# Patient Record
Sex: FEMALE | Race: WHITE | Employment: FULL TIME | URBAN - METROPOLITAN AREA
[De-identification: names, ages, dates, MRNs, and addresses within clinical notes are randomized per-mention and may not be internally consistent; named-entity substitution may affect disease eponyms.]

---

## 2021-12-02 NOTE — PROGRESS NOTES
ASSESSMENT/PLAN:  Below is the assessment and plan developed based on review of pertinent history, physical exam, labs, studies, and medications. 1. Patellofemoral chondrosis of left knee      No follow-ups on file. In discussion with the patient, we considered the numerus possible diagnoses that could be contributing to their present symptoms. We also deliberated on the extensive management options that must be considered to treat their current condition. We reviewed their accessible prior medical records, diagnostic tests, and current health and employment information. We considered how these symptoms were affecting the patient´s activities of daily living as well as employment and fitness activities. The patient had various questions regarding the possible risks, benefits, complications, morbidity and mortality regarding their diagnosis and treatment options. The patients´ comorbidities were considered, and I advocated that they consider maximizing lifestyle modification through nutrition and exercise to aid in addressing their symptoms. Shared decision making yielded an understanding to move forward with conservation treatment preferences. The patient expressed understanding that if conservative management fails to alleviate the present symptoms they will return to office for re-evaluation and consideration of additional diagnostic tests and potential surgical options. In the interim, we have recommned ice, elevation and anti-inflammatory medications along with a physician directed home exercise program. We discussed the risks and common side effects of anti-inflamatory medications and instructed the patient to discontinue the medication and contact us if they experienced any side effects. The patient was encouraged to discuss the possible side effects with their family physician or pharmacist prior to initiating any new medications.        Given that the patient's symptoms are increasing in frequency and duration we have decided to prescribe physical therapy. We talked about the fact that the goal of physical therapy is for the therapsit to assist in developing a program to help return the patient to full strength, function and mobility and decrease pain. We also discussed that the therpasit may combine several techniques to help decrease pain. These include but are not limited to stretching,  balance exercises, strength training, massage, cold and heat therapy, and electrical stimulation. Althoough, physical therpay is generally safe, we went over the potential risks to include the worsening of pre-existing conditions, continued pain and no improvement in flexibility, mobility and strength. We will have the patient follow up after physical therapy to closely monitor their progress. We talked about following up sooner if therapy is not progressing on a weekly basis. After a long discussion regarding treatment options, we have decided to prescribe an oral medication. We discussed the risks and common side effects of the medication and instructed the patient to discontinue the medication and contact us if they experienced any side effects. We also encouraged the patient to discuss the possible side effects with their family physician or pharmacist prior to initiating any new medications. We will see her back in 2 months time to evaluate her progress. Will allow her to return to work without restrictions. We will continue her on Mobic. SUBJECTIVE/OBJECTIVE:  Case Vargas (: 1955) is a 61 y.o. female, patient,here for evaluation of the Knee Pain (left)  The patient returns today for follow-up for her left knee. She has had a long history with the left knee. She originally injured it at work. She underwent an arthroscopy of the knee with chondroplasty and plica excision approximately 4 months ago. She is attended physical therapy.   She is continued have some popping clicking in her knee. She denies any reinjury. Continues to complain over the lateral femoral condyle and lateral patella. She is back to work, able to take a 10 min break every hour. She complains of swelling in the knee. Physical Exam    Upon physical examination, the patient is well developed, well nourished, alert and oriented times three, with normal mood and affect and walks with an antalgic gait. Upon examination of the left knee, the patient is nontender to palpation along the medial and lateral joint lines, and has no effusion. They are tender to palpation along the medial and lateral facets of the patella. They have crepitus of the patellofemoral joint with range of motion and discomfort with patella grind testing. The patient has no discomfort with Latia´s maneuvers, and the knee is stable. They have full range of motion. They have 5/5 strength, and are neurovascularly intact distally. There is no erythema, warmth or skin lesions present. On examination of the contralateral extremity, the patient is nontender to palpation and has excellent range of motion, stability and strength. Imaging:    I have reviewed the patient´s previous diagnostic tests in an effort to support the diagnosis and treatment options. Allergies   Allergen Reactions    Codeine Hives       Current Outpatient Medications   Medication Sig    diclofenac EC (VOLTAREN) 75 mg EC tablet      No current facility-administered medications for this visit. Past Medical History:   Diagnosis Date    Asthma        History reviewed. No pertinent surgical history. History reviewed. No pertinent family history.     Social History     Socioeconomic History    Marital status: SINGLE     Spouse name: Not on file    Number of children: Not on file    Years of education: Not on file    Highest education level: Not on file   Occupational History    Not on file   Tobacco Use    Smoking status: Current Every Day Smoker    Smokeless tobacco: Never Used   Substance and Sexual Activity    Alcohol use: Never    Drug use: Never    Sexual activity: Not on file   Other Topics Concern    Not on file   Social History Narrative    Not on file     Social Determinants of Health     Financial Resource Strain:     Difficulty of Paying Living Expenses: Not on file   Food Insecurity:     Worried About Running Out of Food in the Last Year: Not on file    Te of Food in the Last Year: Not on file   Transportation Needs:     Lack of Transportation (Medical): Not on file    Lack of Transportation (Non-Medical): Not on file   Physical Activity:     Days of Exercise per Week: Not on file    Minutes of Exercise per Session: Not on file   Stress:     Feeling of Stress : Not on file   Social Connections:     Frequency of Communication with Friends and Family: Not on file    Frequency of Social Gatherings with Friends and Family: Not on file    Attends Taoist Services: Not on file    Active Member of [address] [place] [address] or Organizations: Not on file    Attends Club or Organization Meetings: Not on file    Marital Status: Not on file   Intimate Partner Violence:     Fear of Current or Ex-Partner: Not on file    Emotionally Abused: Not on file    Physically Abused: Not on file    Sexually Abused: Not on file   Housing Stability:     Unable to Pay for Housing in the Last Year: Not on file    Number of [place] in the Last Year: Not on file    Unstable Housing in the Last Year: Not on file       Review of Systems    Pain Assessment  12/3/2021   Location of Pain Knee   Location Modifiers Left   Severity of Pain 7       Vitals:  Ht 4' 10\" (1.473 m)   Wt 100 lb (45.4 kg)   BMI 20.90 kg/m²    Body mass index is 20.9 kg/m². ROS     Positive for: Musculoskeletal    Last edited by Leena Ponce on 12/3/2021 12:10 PM. (History)            An electronic signature was used to authenticate this note.   -- Avinash Nichols MD

## 2021-12-03 ENCOUNTER — OFFICE VISIT (OUTPATIENT)
Dept: ORTHOPEDIC SURGERY | Age: 66
End: 2021-12-03
Payer: OTHER MISCELLANEOUS

## 2021-12-03 VITALS — HEIGHT: 58 IN | BODY MASS INDEX: 20.99 KG/M2 | WEIGHT: 100 LBS

## 2021-12-03 DIAGNOSIS — M22.42 PATELLOFEMORAL CHONDROSIS OF LEFT KNEE: Primary | ICD-10-CM

## 2021-12-03 PROCEDURE — 99214 OFFICE O/P EST MOD 30 MIN: CPT | Performed by: ORTHOPAEDIC SURGERY

## 2021-12-03 RX ORDER — DICLOFENAC SODIUM 75 MG/1
TABLET, DELAYED RELEASE ORAL
COMMUNITY
Start: 2021-09-03 | End: 2022-05-09

## 2021-12-03 RX ORDER — MELOXICAM 15 MG/1
15 TABLET ORAL DAILY
Qty: 30 TABLET | Refills: 3 | Status: SHIPPED | OUTPATIENT
Start: 2021-12-03 | End: 2022-02-11 | Stop reason: SDUPTHER

## 2021-12-03 NOTE — LETTER
NOTIFICATION RETURN TO WORK / SCHOOL    12/3/2021 12:26 PM    Ms. [address] [address]      To Whom It May Concern:    Long Medina is currently under the care of [facility]. She will return to work/school on: She will continue to work as tolerated on the left knee. We will see her back in 2 months time to evaluate her progress. She will continue her formal physical therapy 3 times a week. We will continue her on the meloxicam.    If there are questions or concerns please have the patient contact our office.         Sincerely,      Harper Mackey MD

## 2022-02-10 PROBLEM — M22.42 PATELLOFEMORAL CHONDROSIS OF LEFT KNEE: Status: ACTIVE | Noted: 2022-02-10

## 2022-02-11 ENCOUNTER — OFFICE VISIT (OUTPATIENT)
Dept: ORTHOPEDIC SURGERY | Age: 67
End: 2022-02-11
Payer: OTHER MISCELLANEOUS

## 2022-02-11 VITALS — BODY MASS INDEX: 20.99 KG/M2 | WEIGHT: 100 LBS | HEIGHT: 58 IN

## 2022-02-11 DIAGNOSIS — M22.42 PATELLOFEMORAL CHONDROSIS OF LEFT KNEE: Primary | ICD-10-CM

## 2022-02-11 PROCEDURE — 99214 OFFICE O/P EST MOD 30 MIN: CPT | Performed by: ORTHOPAEDIC SURGERY

## 2022-02-11 RX ORDER — MELOXICAM 15 MG/1
15 TABLET ORAL DAILY
Qty: 30 TABLET | Refills: 3 | Status: SHIPPED | OUTPATIENT
Start: 2022-02-11 | End: 2022-08-24

## 2022-02-11 NOTE — LETTER
NOTIFICATION RETURN TO WORK / SCHOOL    2/11/2022 12:36 PM    Ms. [address] [address]      To Whom It May Concern:    Addis Trammell is currently under the care of [facility]. She will return to work on 2/14/2022 in a seated position. If there are questions or concerns please have the patient contact our office.         Sincerely,      Austin Vance MD

## 2022-02-11 NOTE — PROGRESS NOTES
ASSESSMENT/PLAN:  Below is the assessment and plan developed based on review of pertinent history, physical exam, labs, studies, and medications. 1. Patellofemoral chondrosis of left knee      In discussion with the patient, we considered the numerus possible diagnoses that could be contributing to their present symptoms. We also deliberated on the extensive management options that must be considered to treat their current condition. We reviewed their accessible prior medical records, diagnostic tests, and current health and employment information. We considered how these symptoms were affecting the patient´s activities of daily living as well as employment and fitness activities. The patient had various questions regarding the possible risks, benefits, complications, morbidity and mortality regarding their diagnosis and treatment options. The patients´ comorbidities were considered, and I advocated that they consider maximizing lifestyle modification through nutrition and exercise to aid in addressing their symptoms. Shared decision making yielded an understanding to move forward with conservation treatment preferences. The patient expressed understanding that if conservative management fails to alleviate the present symptoms they will return to office for re-evaluation and consideration of additional diagnostic tests and potential surgical options. In the interim, we have recommended ice, elevation,  and anti-inflammatory medications along with a physician directed home exercise program. We discussed the risks and common side effects of anti-inflamatory medications and instructed the patient to discontinue the medication and contact us if they experienced any side effects. The patient was encouraged to discuss the possible side effects with their family physician or pharmacist prior to initiating any new medications.      After a long discussion regarding treatment options, we have decided to prescribe an oral medication. We discussed the risks and common side effects of the medication and instructed the patient to discontinue the medication and contact us if they experienced any side effects. We also encouraged the patient to discuss the possible side effects with their family physician or pharmacist prior to initiating any new medications. Given the fact that her swelling has gotten much worse since she has been on her feet will allow her to return to work on Monday in a seated position. I did offer an aspiration injection which she declined. We will start her on Mobic again. I urged her to ice and elevate. I urged her to return to work on Monday in a seated position. SUBJECTIVE/OBJECTIVE:  Layo Kam (: 1955) is a 61 y.o. female, patient,here for evaluation of the Knee Pain (left)  The patient returns today for follow-up for her left knee. She has had a long history with the left knee. She originally injured it at work. She underwent an arthroscopy of the knee with chondroplasty and plica excision approximately 4 months ago. She is attended physical therapy. She is continued have some popping clicking in her knee. She denies any reinjury. Continues to complain over the lateral femoral condyle and lateral patella. She is back to work, able to take a 10 min break every hour. She complains of swelling in the knee. Physical Exam    Upon physical examination, the patient is well developed, well nourished, alert and oriented times three, with normal mood and affect and walks with an antalgic gait. Upon examination of the left knee, the patient is nontender to palpation along the medial and lateral joint lines, and has no effusion. They are tender to palpation along the medial and lateral facets of the patella. They have crepitus of the patellofemoral joint with range of motion and discomfort with patella grind testing.  The patient has no discomfort with Latia´s maneuvers, and the knee is stable. They have full range of motion. They have 5/5 strength, and are neurovascularly intact distally. There is no erythema, warmth or skin lesions present. On examination of the contralateral extremity, the patient is nontender to palpation and has excellent range of motion, stability and strength. Imaging:    I have reviewed the patient´s previous diagnostic tests in an effort to support the diagnosis and treatment options. Allergies   Allergen Reactions    Codeine Hives       Current Outpatient Medications   Medication Sig    diclofenac EC (VOLTAREN) 75 mg EC tablet      No current facility-administered medications for this visit. Past Medical History:   Diagnosis Date    Asthma        History reviewed. No pertinent surgical history. History reviewed. No pertinent family history. Social History     Socioeconomic History    Marital status: SINGLE     Spouse name: Not on file    Number of children: Not on file    Years of education: Not on file    Highest education level: Not on file   Occupational History    Not on file   Tobacco Use    Smoking status: Current Every Day Smoker    Smokeless tobacco: Never Used   Substance and Sexual Activity    Alcohol use: Never    Drug use: Never    Sexual activity: Not on file   Other Topics Concern    Not on file   Social History Narrative    Not on file     Social Determinants of Health     Financial Resource Strain:     Difficulty of Paying Living Expenses: Not on file   Food Insecurity:     Worried About Running Out of Food in the Last Year: Not on file    Te of Food in the Last Year: Not on file   Transportation Needs:     Lack of Transportation (Medical): Not on file    Lack of Transportation (Non-Medical):  Not on file   Physical Activity:     Days of Exercise per Week: Not on file    Minutes of Exercise per Session: Not on file   Stress:     Feeling of Stress : Not on file   Social Connections:     Frequency of Communication with Friends and Family: Not on file    Frequency of Social Gatherings with Friends and Family: Not on file    Attends Sabianist Services: Not on file    Active Member of Clubs or Organizations: Not on file    Attends Club or Organization Meetings: Not on file    Marital Status: Not on file   Intimate Partner Violence:     Fear of Current or Ex-Partner: Not on file    Emotionally Abused: Not on file    Physically Abused: Not on file    Sexually Abused: Not on file   Housing Stability:     Unable to Pay for Housing in the Last Year: Not on file    Number of [place] in the Last Year: Not on file    Unstable Housing in the Last Year: Not on file       Review of Systems    Pain Assessment  12/3/2021   Location of Pain Knee   Location Modifiers Left   Severity of Pain 7       Vitals:  Ht 4' 10\" (1.473 m)   Wt 100 lb (45.4 kg)   BMI 20.90 kg/m²    Body mass index is 20.9 kg/m². ROS     Positive for: Musculoskeletal    Last edited by Susana Fabry on 12/3/2021 12:10 PM. (History)            An electronic signature was used to authenticate this note.   -- Dionicio Nixon MD

## 2022-03-04 ENCOUNTER — OFFICE VISIT (OUTPATIENT)
Dept: ORTHOPEDIC SURGERY | Age: 67
End: 2022-03-04
Payer: OTHER MISCELLANEOUS

## 2022-03-04 VITALS — HEIGHT: 58 IN | BODY MASS INDEX: 20.99 KG/M2 | WEIGHT: 100 LBS

## 2022-03-04 DIAGNOSIS — M22.42 PATELLOFEMORAL CHONDROSIS OF LEFT KNEE: Primary | ICD-10-CM

## 2022-03-04 PROCEDURE — 20610 DRAIN/INJ JOINT/BURSA W/O US: CPT | Performed by: ORTHOPAEDIC SURGERY

## 2022-03-04 PROCEDURE — 99214 OFFICE O/P EST MOD 30 MIN: CPT | Performed by: ORTHOPAEDIC SURGERY

## 2022-03-04 RX ORDER — HYALURONATE SOD, CROSS-LINKED 30 MG/3 ML
30 SYRINGE (ML) INTRAARTICULAR ONCE
Qty: 1 EACH | Refills: 0 | Status: SHIPPED | OUTPATIENT
Start: 2022-03-04 | End: 2022-03-04

## 2022-03-04 NOTE — LETTER
Date of appointment:  3/4/2022     Examining Physician: Michelle Hernandez MD    Employee information    Name:  Jyoti Julien                                          YOB: 1955                               Medical record number: [id number]    Company information    Reason for visit: Follow up    Body Part Injured: left knee    Follow-up Requested:  Four Weeks    Treatment: Physical therapy prescribed    Work Status Restrictions: No restrictions - May resume normal duties immediately      Signed: Michelle Hernandez MD

## 2022-03-04 NOTE — PROGRESS NOTES
ASSESSMENT/PLAN:  Below is the assessment and plan developed based on review of pertinent history, physical exam, labs, studies, and medications. 1. Patellofemoral chondrosis of left knee      In discussion with the patient, we considered the numerus possible diagnoses that could be contributing to their present symptoms. We also deliberated on the extensive management options that must be considered to treat their current condition. We reviewed their accessible prior medical records, diagnostic tests, and current health and employment information. We considered how these symptoms were affecting the patient´s activities of daily living as well as employment and fitness activities. The patient had various questions regarding the possible risks, benefits, complications, morbidity and mortality regarding their diagnosis and treatment options. The patients´ comorbidities were considered, and I advocated that they consider maximizing lifestyle modification through nutrition and exercise to aid in addressing their symptoms. Shared decision making yielded an understanding to move forward with conservation treatment preferences. The patient expressed understanding that if conservative management fails to alleviate the present symptoms they will return to office for re-evaluation and consideration of additional diagnostic tests and potential surgical options. In the interim, we have recommended ice, elevation,  and anti-inflammatory medications along with a physician directed home exercise program. We discussed the risks and common side effects of anti-inflamatory medications and instructed the patient to discontinue the medication and contact us if they experienced any side effects. The patient was encouraged to discuss the possible side effects with their family physician or pharmacist prior to initiating any new medications.      After a long discussion regarding treatment options, we have decided to prescribe an oral medication. We discussed the risks and common side effects of the medication and instructed the patient to discontinue the medication and contact us if they experienced any side effects. We also encouraged the patient to discuss the possible side effects with their family physician or pharmacist prior to initiating any new medications. We discussed the possibility of an aspiration and injection of a local anesthetic to relieve pain and decrease swelling and inflammation of the left knee. The risks of an injection which include but are not limited to cartilage damage, death of nearby bone, joint infection, nerve damage, temporary flare of pain and inflammation in the joint, thinning of nearby bone (osteoporosis) around the injection site were reviewed at length. We discussed the chance of increased bleeding and bruising if the patient is on blood thinners or certain dietary supplements that have a blood-thinning effect. We advised patients that have an active infection, history of allergic reactions to local anesthetics or take medications that may prohibit them from receiving an injection to talk to their primary care physician before receiving and injection. After explaining the risks and benefits of the procedure and obtaining verbal informed consent from the patient, the proposed area for aspiration was confirmed with the patient. After all questions and concerns were addressed, the skin was prepped with alcohol to reduce the chances of infection. The skin was anesthetized with topical ethylene chloride spray and a local anesthetic was injected into the skin around the site of aspiration. After the local anesthetic became affective, the knee fluid was aspirated from the left knee in a sterile fashion without difficulty. The needle was removed and disposed of in a sterile container. The patient tolerated the injection well and a band-aid was placed on the skin.  The patient was counseled on protecting the injection area, avoiding water submersion for 2 days, icing for pain relief and looking for signs and symptoms of infection. We requested that the patient contact us if any symptoms persist greater than 48 hours after the injection. We talked about the fact that she was not at maximal medical improvement at this point. We talked about the fact that the date would be determined. We gave a prescription for some gel 1 viscosupplementation in order to try that at her next visit. I will see her back in 1 month's time. SUBJECTIVE/OBJECTIVE:  Amira Grigsby (: 1955) is a 61 y.o. female, patient,here for evaluation of the Knee Pain (left)  The patient returns today for follow-up for her left knee. She has had a long history with the left knee. She originally injured it at work. She underwent an arthroscopy of the knee with chondroplasty and plica excision approximately 5 months ago. She is attended physical therapy. She is continued have some popping clicking in her knee. She denies any reinjury. Continues to complain over the lateral femoral condyle and lateral patella. She is back to work, able to take a 10 min break every hour. She complains of swelling in the knee. She returns today for follow-up. She reports she has been taking the diclofenac. She reports her knee swells at the end of the day. She reports she has difficulty wearing the brace because is so swollen. She denies any reinjury. She is accompanied by her  today in the office. Physical Exam    Upon physical examination, the patient is well developed, well nourished, alert and oriented times three, with normal mood and affect and walks with an antalgic gait. Upon examination of the left knee, the patient is nontender to palpation along the medial and lateral joint lines, and has no effusion. They are tender to palpation along the medial and lateral facets of the patella.  They have crepitus of the patellofemoral joint with range of motion and discomfort with patella grind testing. The patient has no discomfort with Latia´s maneuvers, and the knee is stable. They have full range of motion. They have 5/5 strength, and are neurovascularly intact distally. There is no erythema, warmth or skin lesions present. On examination of the contralateral extremity, the patient is nontender to palpation and has excellent range of motion, stability and strength. Imaging:    I have reviewed the patient´s previous diagnostic tests in an effort to support the diagnosis and treatment options. Allergies   Allergen Reactions    Codeine Hives       Current Outpatient Medications   Medication Sig    diclofenac EC (VOLTAREN) 75 mg EC tablet      No current facility-administered medications for this visit. Past Medical History:   Diagnosis Date    Asthma        History reviewed. No pertinent surgical history. History reviewed. No pertinent family history. Social History     Socioeconomic History    Marital status: SINGLE     Spouse name: Not on file    Number of children: Not on file    Years of education: Not on file    Highest education level: Not on file   Occupational History    Not on file   Tobacco Use    Smoking status: Current Every Day Smoker    Smokeless tobacco: Never Used   Substance and Sexual Activity    Alcohol use: Never    Drug use: Never    Sexual activity: Not on file   Other Topics Concern    Not on file   Social History Narrative    Not on file     Social Determinants of Health     Financial Resource Strain:     Difficulty of Paying Living Expenses: Not on file   Food Insecurity:     Worried About Running Out of Food in the Last Year: Not on file    Te of Food in the Last Year: Not on file   Transportation Needs:     Lack of Transportation (Medical): Not on file    Lack of Transportation (Non-Medical):  Not on file   Physical Activity:     Days of Exercise per Week: Not on file    Minutes of Exercise per Session: Not on file   Stress:     Feeling of Stress : Not on file   Social Connections:     Frequency of Communication with Friends and Family: Not on file    Frequency of Social Gatherings with Friends and Family: Not on file    Attends Sabianism Services: Not on file    Active Member of [address] [place] [address] or Organizations: Not on file    Attends Club or Organization Meetings: Not on file    Marital Status: Not on file   Intimate Partner Violence:     Fear of Current or Ex-Partner: Not on file    Emotionally Abused: Not on file    Physically Abused: Not on file    Sexually Abused: Not on file   Housing Stability:     Unable to Pay for Housing in the Last Year: Not on file    Number of [place] in the Last Year: Not on file    Unstable Housing in the Last Year: Not on file       Review of Systems    Pain Assessment  12/3/2021   Location of Pain Knee   Location Modifiers Left   Severity of Pain 7       Vitals:  Ht 4' 10\" (1.473 m)   Wt 100 lb (45.4 kg)   BMI 20.90 kg/m²    Body mass index is 20.9 kg/m². ROS     Positive for: Musculoskeletal    Last edited by Michelle Schmidt on 12/3/2021 12:10 PM. (History)            An electronic signature was used to authenticate this note.   -- Whit Hopper MD

## 2022-03-04 NOTE — LETTER
Date of appointment:  3/4/2022     Examining Physician: Arielle Garrett MD    Employee information    Name:  Claudia Min                                          YOB: 1955                               Medical record number: [id number]    Company information    Reason for visit: Follow up     Body Part Injured: left knee    Follow-up Requested:  Four Weeks    Treatment: Physical therapy prescribed    Work Status Restrictions:  May return to work with the following restrictions: Must be seated to work      Signed: Arielle Garrett MD

## 2022-03-18 PROBLEM — M22.42 PATELLOFEMORAL CHONDROSIS OF LEFT KNEE: Status: ACTIVE | Noted: 2022-02-10

## 2022-04-08 ENCOUNTER — OFFICE VISIT (OUTPATIENT)
Dept: ORTHOPEDIC SURGERY | Age: 67
End: 2022-04-08
Payer: OTHER MISCELLANEOUS

## 2022-04-08 VITALS — HEIGHT: 58 IN | WEIGHT: 100 LBS | BODY MASS INDEX: 20.99 KG/M2

## 2022-04-08 DIAGNOSIS — M22.42 PATELLOFEMORAL CHONDROSIS OF LEFT KNEE: Primary | ICD-10-CM

## 2022-04-08 PROCEDURE — 99214 OFFICE O/P EST MOD 30 MIN: CPT | Performed by: ORTHOPAEDIC SURGERY

## 2022-04-08 NOTE — PROGRESS NOTES
ASSESSMENT/PLAN:  Below is the assessment and plan developed based on review of pertinent history, physical exam, labs, studies, and medications. 1. Patellofemoral chondrosis of left knee  2. History of left knee arthroscopy      In discussion with the patient, we considered the numerus possible diagnoses that could be contributing to their present symptoms. We also deliberated on the extensive management options that must be considered to treat their current condition. We reviewed their accessible prior medical records, diagnostic tests, and current health and employment information. We considered how these symptoms were affecting the patient´s activities of daily living as well as employment and fitness activities. The patient had various questions regarding the possible risks, benefits, complications, morbidity and mortality regarding their diagnosis and treatment options. The patients´ comorbidities were considered, and I advocated that they consider maximizing lifestyle modification through nutrition and exercise to aid in addressing their symptoms. Shared decision making yielded an understanding to move forward with conservation treatment preferences. The patient expressed understanding that if conservative management fails to alleviate the present symptoms they will return to office for re-evaluation and consideration of additional diagnostic tests and potential surgical options. In the interim, we have recommended ice, elevation,  and anti-inflammatory medications along with a physician directed home exercise program. We discussed the risks and common side effects of anti-inflamatory medications and instructed the patient to discontinue the medication and contact us if they experienced any side effects. The patient was encouraged to discuss the possible side effects with their family physician or pharmacist prior to initiating any new medications.      After a long discussion regarding treatment options, we have decided to continue an oral medication. She states she has plenty of refills on this. We discussed the risks and common side effects of the medication and instructed the patient to discontinue the medication and contact us if they experienced any side effects. We also encouraged the patient to discuss the possible side effects with their family physician or pharmacist prior to initiating any new medications. We talked about the fact that she was not at maximal medical improvement at this point. Unfortunately we have tried extensive nonoperative management and arthroscopy. She continues to have pain and swelling. At this point we will get her over to see one of our adult reconstruction partners to evaluate and see if she is a potential candidate for patellofemoral arthroplasty. SUBJECTIVE/OBJECTIVE:  Carrie Lemus (: 1955) is a 61 y.o. female, patient,here for evaluation of the Knee Pain (left)  The patient returns today for follow-up for her left knee. She has had a long history with the left knee. She originally injured it at work. She underwent an arthroscopy of the knee with chondroplasty and plica excision 713. Unfortunately she continues to have pain. We have tried anti-inflammatories, ice, heat, injections but she continues to have pain. Her pain is localized to the patellofemoral joint. Pain is worsened with prolonged standing, walking, kneeling or squatting. She still has clicking and catching in her knee. She does not feel like she has had significant improvements. She still going to physical therapy weekly. Her  is with her today. Physical Exam    Upon physical examination, the patient is well developed, well nourished, alert and oriented times three, with normal mood and affect and walks with an antalgic gait.     Upon examination of the left knee, the patient is nontender to palpation along the medial and lateral joint lines, and has a small effusion. He has well-healed portal sites over the anterior knee. There is no evidence of erythema, dehiscence, or infection. She is tender to palpation along the medial and lateral facets of the patella. They have crepitus of the patellofemoral joint with range of motion and discomfort with patella grind testing. The patient has no discomfort with Latia´s maneuvers, and the knee is stable. They have full range of motion. They have 5/5 strength, and are neurovascularly intact distally. There is no erythema, warmth or skin lesions present. On examination of the contralateral extremity, the patient is nontender to palpation and has excellent range of motion, stability and strength. Imaging:    I have reviewed the patient´s previous diagnostic tests in an effort to support the diagnosis and treatment options. Allergies   Allergen Reactions    Codeine Hives       Current Outpatient Medications   Medication Sig    diclofenac EC (VOLTAREN) 75 mg EC tablet      No current facility-administered medications for this visit. Past Medical History:   Diagnosis Date    Asthma        History reviewed. No pertinent surgical history. History reviewed. No pertinent family history.     Social History     Socioeconomic History    Marital status: SINGLE     Spouse name: Not on file    Number of children: Not on file    Years of education: Not on file    Highest education level: Not on file   Occupational History    Not on file   Tobacco Use    Smoking status: Current Every Day Smoker    Smokeless tobacco: Never Used   Substance and Sexual Activity    Alcohol use: Never    Drug use: Never    Sexual activity: Not on file   Other Topics Concern    Not on file   Social History Narrative    Not on file     Social Determinants of Health     Financial Resource Strain:     Difficulty of Paying Living Expenses: Not on file   Food Insecurity:     Worried About [address] in the Last Year: Not on file    Ran Out of Food in the Last Year: Not on file   Transportation Needs:     Lack of Transportation (Medical): Not on file    Lack of Transportation (Non-Medical): Not on file   Physical Activity:     Days of Exercise per Week: Not on file    Minutes of Exercise per Session: Not on file   Stress:     Feeling of Stress : Not on file   Social Connections:     Frequency of Communication with Friends and Family: Not on file    Frequency of Social Gatherings with Friends and Family: Not on file    Attends Sabianism Services: Not on file    Active Member of [address] [place] [address] [employer] or Organizations: Not on file    Attends Club or Organization Meetings: Not on file    Marital Status: Not on file   Intimate Partner Violence:     Fear of Current or Ex-Partner: Not on file    Emotionally Abused: Not on file    Physically Abused: Not on file    Sexually Abused: Not on file   Housing Stability:     Unable to Pay for Housing in the Last Year: Not on file    Number of [place] in the Last Year: Not on file    Unstable Housing in the Last Year: Not on file       Review of Systems    Pain Assessment  12/3/2021   Location of Pain Knee   Location Modifiers Left   Severity of Pain 7       Vitals:  Ht 4' 10\" (1.473 m)   Wt 100 lb (45.4 kg)   BMI 20.90 kg/m²    Body mass index is 20.9 kg/m². ROS     Positive for: Musculoskeletal    Last edited by Antoinette Gilbert on 12/3/2021 12:10 PM. (History)            An electronic signature was used to authenticate this note.   -- Rigo Dee MD

## 2022-04-08 NOTE — LETTER
4/8/2022 3:12 PM    [facility] [id number]      Ms. Demar Viramontes was seen today by Dr. Jose Rose. She is allowed to return to work with no restrictions.          Sincerely,      Simba Talavera, DO

## 2022-04-22 ENCOUNTER — OFFICE VISIT (OUTPATIENT)
Dept: ORTHOPEDIC SURGERY | Age: 67
End: 2022-04-22
Payer: OTHER MISCELLANEOUS

## 2022-04-22 VITALS — WEIGHT: 100 LBS | BODY MASS INDEX: 20.99 KG/M2 | HEIGHT: 58 IN

## 2022-04-22 DIAGNOSIS — G89.29 CHRONIC PAIN OF LEFT KNEE: Primary | ICD-10-CM

## 2022-04-22 DIAGNOSIS — M25.562 CHRONIC PAIN OF LEFT KNEE: Primary | ICD-10-CM

## 2022-04-22 PROCEDURE — 99214 OFFICE O/P EST MOD 30 MIN: CPT | Performed by: ORTHOPAEDIC SURGERY

## 2022-04-22 NOTE — LETTER
NOTIFICATION TO RETURN TO WORK / SCHOOL           Ms. Ester Carrasco  [address] [id number]        To Whom It May Concern:      Please excuse Ester Carrasco for an appointment in our office on 4/22/2022.     If you have any questions, or if we may be of further assistance, do not hesitate to contact us at [phone]      Comments:     Sincerely,    Anthony Russell MD  [employer]

## 2022-04-26 NOTE — PROGRESS NOTES
Yulissa Bergman (: 1955) is a 61 y.o. female patient, here for evaluation of the following chief complaint(s):  Knee Pain (left knee pain)       ASSESSMENT/PLAN:  Below is the assessment and plan developed based on review of pertinent history, physical exam, labs, studies, and medications. 59-year-old female comes in today complaining of chronic left knee pain. She has been treating this conservatively for a long period of time. She has had more than 6 months of conservative therapy. She had a previous knee scope which revealed patellofemoral osteoarthrosis. Her patellofemoral joint appears well-preserved on her x-rays. Since conservative she has had other conservative treatments I would like to get an MRI to further evaluate the extensive nature of her cartilage damage before considering possible surgery. We will get this set up and follow-up with her when complete      1. Chronic pain of left knee  -     XR KNEE LT MIN 4 V; Future  -     MRI KNEE LT WO CONT; Future      Encounter Diagnosis   Name Primary?  Chronic pain of left knee Yes        No follow-ups on file. SUBJECTIVE/OBJECTIVE:  Yulissa Bergman (: 1955) is a 61 y.o. female who presents today for the following:  Chief Complaint   Patient presents with    Knee Pain     left knee pain       59-year-old female comes today complaining of persistent left knee pain. This is a work-related injury and she has been treated by my partner Dr. Claudeen Dauphin. He had a knee scope and plica excision. She said that she is still having pain that is anteriorly based. She has the pain is moderate to severe and bothers her daily. She says she has intermittent swelling as well. She was referred here to discuss the possibility of patellofemoral replacement    IMAGING:  XR Results (most recent):  Results from Appointment encounter on 22    XR KNEE LT MIN 4 V    Narrative  4 views ordered and reviewed left knee.   Mild patella maltracking bilaterally. Joint space narrowing and lateral patellar facet       Allergies   Allergen Reactions    Codeine Hives       Current Outpatient Medications   Medication Sig    meloxicam (MOBIC) 15 mg tablet Take 1 Tablet by mouth daily.  diclofenac EC (VOLTAREN) 75 mg EC tablet      No current facility-administered medications for this visit. Past Medical History:   Diagnosis Date    Asthma         No past surgical history on file. No family history on file. Social History     Tobacco Use    Smoking status: Current Every Day Smoker    Smokeless tobacco: Never Used   Substance Use Topics    Alcohol use: Never        All systems reviewed x 12 and were negative with the exception of None      Pain Assessment  12/3/2021   Location of Pain Knee   Location Modifiers Left   Severity of Pain 7          Vitals:  Ht 4' 10\" (1.473 m)   Wt 100 lb (45.4 kg)   BMI 20.90 kg/m²    Body mass index is 20.9 kg/m². Physical Exam    General: NAD, well developed, well nourished, alert and oriented x 3. Cardiac: Extremities well perfused    Respiratory: Nonlabored breathing    LLE: Normal gait and station. Negative stinchfield. No effusion noted. No previous incisions noted. ROM 0-120 degrees. Grossly stable to varus/valgus stress and anterior/posterior drawer tests. Pain with patellar grind. Mild crepitus with range of motion. .  Motor grossly intact. RLE: Normal gait and station. Negative stinchfield. No effusion noted. No previous incisions noted. ROM 0-120 degrees. Grossly stable to varus/valgus stress and anterior/posterior drawer tests. Negative [place]rays. Motor grossly intact. Vascular: Palpable pedal pulses, equal bilaterally. Skin: Warm well perfused, cap refill < 2 sec. An electronic signature was used to authenticate this note.   -- Shekhar Nicole MD

## 2022-05-09 ENCOUNTER — OFFICE VISIT (OUTPATIENT)
Dept: ORTHOPEDIC SURGERY | Age: 67
End: 2022-05-09
Payer: OTHER MISCELLANEOUS

## 2022-05-09 VITALS — WEIGHT: 100 LBS | BODY MASS INDEX: 20.99 KG/M2 | HEIGHT: 58 IN

## 2022-05-09 DIAGNOSIS — M17.12 ARTHRITIS OF LEFT KNEE: Primary | ICD-10-CM

## 2022-05-09 PROCEDURE — 99214 OFFICE O/P EST MOD 30 MIN: CPT | Performed by: ORTHOPAEDIC SURGERY

## 2022-05-09 NOTE — LETTER
NOTIFICATION TO RETURN TO WORK / SCHOOL           Ms. Omar Slaughter  [address] [place] [phone]        To Whom It May Concern:      Please excuse Omar Slaughter for an appointment in our office on 5/9/2022.     If you have any questions, or if we may be of further assistance, do not hesitate to contact us at [phone]     Comments:     Sincerely,    MD Reuben Ruano

## 2022-05-09 NOTE — PROGRESS NOTES
Leticia Price (: 1955) is a 61 y.o. female patient, here for evaluation of the following chief complaint(s):  Knee Pain (left knee MRI results)       ASSESSMENT/PLAN:  Below is the assessment and plan developed based on review of pertinent history, physical exam, labs, studies, and medications. 82-year-old female comes in today for follow-up. Her previous notes are documented in the chart but she had a work-related injury in that setting. Pain. She has had multiple interventions including a prescription for injections and physical therapy. I ordered another MRI to get further evaluation of her cartilage. 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 She has some very mild changes in the medial lateral compartment and does not even have a meniscus tear. I discussed options with her. I told her I would recommend continued conservative treatment. She says she is tired of living with the pain and says that her knee gives out on her regularly and causes her to fall. We did discuss the possibility of an outpatient patellofemoral replacement. She is going to think about this. I told her there is absolutely no guarantee this will eliminate all of her issues. She understands this. She will let us know    Risks and benefits of joint arthroplasty discussed at length including but not limited to bleeding, need for blood transfusion, infection, damage to surrounding structures, intra-operative fracture, blood clots, pulmonary embolism, death. The patient understands the risks of surgery. All questions answered. They elected to move forward. 1. Arthritis of left knee      Encounter Diagnosis   Name Primary?  Arthritis of left knee Yes        No follow-ups on file.       SUBJECTIVE/OBJECTIVE:  Leticia Price (: 1955) is a 61 y.o. female who presents today for the following:  Chief Complaint   Patient presents with    Knee Pain     left knee MRI results 72-year-old female comes in today for follow-up. She continues to suffer from left knee pain from a work-related injury. She was treated by my partner and he referred her to myself. She has had significant interventions in the past including physical therapy, oral medication, injections, knee arthroscopy. She continues to complain of anterior knee pain that gives way and causes her knee to buckle and fall. IMAGING:  XR Results (most recent):  Results from Appointment encounter on 04/22/22    XR KNEE LT MIN 4 V    Narrative  4 views ordered and reviewed left knee. Mild patella maltracking bilaterally. Joint space narrowing and lateral patellar facet       Allergies   Allergen Reactions    Codeine Hives       Current Outpatient Medications   Medication Sig    meloxicam (MOBIC) 15 mg tablet Take 1 Tablet by mouth daily. No current facility-administered medications for this visit. Past Medical History:   Diagnosis Date    Asthma         No past surgical history on file. No family history on file. Social History     Tobacco Use    Smoking status: Current Every Day Smoker    Smokeless tobacco: Never Used   Substance Use Topics    Alcohol use: Never        All systems reviewed x 12 and were negative with the exception of None      Pain Assessment  12/3/2021   Location of Pain Knee   Location Modifiers Left   Severity of Pain 7          Vitals:  Ht 4' 10\" (1.473 m)   Wt 100 lb (45.4 kg)   BMI 20.90 kg/m²    Body mass index is 20.9 kg/m². Physical Exam    General: NAD, well developed, well nourished, alert and oriented x 3. Cardiac: Extremities well perfused    Respiratory: Nonlabored breathing    LLE: No antalgic gait. Mild effusion noted. No previous incisions noted. ROM 0-120 degrees. Grossly stable to varus/valgus stress and anterior/posterior drawer tests. Significant tenderness over patella with patellar grind. .  Motor grossly intact. RLE: Normal gait and station. Negative stinchfield. No effusion noted. No previous incisions noted. ROM 0-120 degrees. Grossly stable to varus/valgus stress and anterior/posterior drawer tests. Negative McMurrays. Motor grossly intact. Vascular: Palpable pedal pulses, equal bilaterally. Skin: Warm well perfused, cap refill < 2 sec. An electronic signature was used to authenticate this note.   -- Letty Portillo MD

## 2022-06-10 DIAGNOSIS — M17.12 ARTHRITIS OF LEFT KNEE: Primary | ICD-10-CM

## 2022-08-17 ENCOUNTER — OFFICE VISIT (OUTPATIENT)
Dept: ORTHOPEDIC SURGERY | Age: 67
End: 2022-08-17

## 2022-08-17 DIAGNOSIS — M17.12 ARTHRITIS OF LEFT KNEE: Primary | ICD-10-CM

## 2022-08-17 DIAGNOSIS — M22.42 PATELLOFEMORAL CHONDROSIS OF LEFT KNEE: ICD-10-CM

## 2022-08-17 DIAGNOSIS — G89.29 CHRONIC PAIN OF LEFT KNEE: ICD-10-CM

## 2022-08-17 DIAGNOSIS — M25.562 CHRONIC PAIN OF LEFT KNEE: ICD-10-CM

## 2022-08-17 NOTE — PROGRESS NOTES
Patient Name: Velia Covert  HLKF:  : 1955  [x]  Patient  Verified  Payor: Erwin [place] / Plan: [id number] / Product Type: Workers Comp /      Total Treatment Time (min): 35  Total Timed Codes (min): 35    Visit #: 1     Referring Provider: Ronnie Mcgrath    Preop evaluation  left knee    Subjective:    Objective:    Gait: antalgic gait pattern on the left lower extremity. She has significant restriction in her hip and knee flexion during swing phase. Balance: Single-leg stance 3 seconds noted to have femoral adduction and internal rotation with gluteus medius weakness. Range of motion: 0-0-115    Strength: Quadriceps strength:  4-/5 . Hip abduction: 3/5   :       HIP Flexion:   4/5    Soft tissue: restriction is noted of the quadriceps, rectus, hamstrings and IT band musculature. Neuro Exam: intact    Treatment:  Low complexity evaluation 2 0 min  Therapeutic activity instruction 15 min  The patient's mobility, range of motion and function was assessed today for postoperative training and instruction. She was instructed in functional gait utilizing a bilateral axillary crutches and a walker  Stair training was completed with hand-held assist.    Patient was instructed in and completed functional exercises for postoperative quadriceps activation,DVT prophylaxis and swelling control, including elevation and the use of cryotherapy. We discussed transfers to and from a car, sit to/form supine,     Written and pictorial exercises and care instructions were provided to the patient. Assessment:  Patient presents with impairments related to gait, range of motion, quad strength, balance, impaired ability to ambulate, negotiate stairs, perform ADLs and participate in desired activities second to knee OA. Outpatient total knee arthroplasty is scheduled for .  SHe will benefit from PT to address above limitations and maximize function I recommend home health for 2 to 4 weeks postoperatively. Goals- 1 visit  1. Patient will demonstrate compliance with home exercise program.  2. Patient to demonstrate Sterrett with gait using device /rolling walker axillary crutches  3. Patient to demonstrate independence with stairs with hand held assistance and rail    Plan:  Patient to receive home health PT post operatively day 1-3 for 2-4 weeks and then return to the clinic for outpatient Physical Therapy. Luann Rodriguez, PT    8/17/2022      The referring physician has reviewed and approved this evaluation and plan of care as noted by the electronic signature attached to note.

## 2022-08-22 DIAGNOSIS — M22.42 PATELLOFEMORAL CHONDROSIS OF LEFT KNEE: Primary | ICD-10-CM

## 2022-08-22 DIAGNOSIS — M17.12 ARTHRITIS OF LEFT KNEE: ICD-10-CM

## 2022-08-24 DIAGNOSIS — Z98.890 STATUS POST KNEE SURGERY: Primary | ICD-10-CM

## 2022-08-24 RX ORDER — OXYCODONE HYDROCHLORIDE 5 MG/1
5 TABLET ORAL
Qty: 42 TABLET | Refills: 0 | Status: SHIPPED | OUTPATIENT
Start: 2022-08-24 | End: 2022-09-03

## 2022-08-24 RX ORDER — FAMOTIDINE 20 MG/1
20 TABLET, FILM COATED ORAL 2 TIMES DAILY
Qty: 60 TABLET | Refills: 0 | Status: SHIPPED | OUTPATIENT
Start: 2022-08-24

## 2022-08-24 RX ORDER — TRAMADOL HYDROCHLORIDE 50 MG/1
50 TABLET ORAL
Qty: 42 TABLET | Refills: 0 | Status: SHIPPED | OUTPATIENT
Start: 2022-08-24 | End: 2022-09-08

## 2022-08-24 RX ORDER — NALOXONE HYDROCHLORIDE 4 MG/.1ML
SPRAY NASAL
Qty: 1 EACH | Refills: 0 | Status: SHIPPED | OUTPATIENT
Start: 2022-08-24 | End: 2022-09-19

## 2022-08-24 RX ORDER — GUAIFENESIN 100 MG/5ML
81 LIQUID (ML) ORAL 2 TIMES DAILY
Qty: 60 TABLET | Refills: 0 | Status: SHIPPED | OUTPATIENT
Start: 2022-08-24

## 2022-08-24 RX ORDER — MELOXICAM 7.5 MG/1
7.5 TABLET ORAL DAILY
Qty: 30 TABLET | Refills: 1 | Status: SHIPPED | OUTPATIENT
Start: 2022-08-24 | End: 2022-09-19

## 2022-09-19 ENCOUNTER — OFFICE VISIT (OUTPATIENT)
Dept: ORTHOPEDIC SURGERY | Age: 67
End: 2022-09-19
Payer: OTHER MISCELLANEOUS

## 2022-09-19 VITALS — HEIGHT: 58 IN | BODY MASS INDEX: 20.99 KG/M2 | WEIGHT: 100 LBS

## 2022-09-19 DIAGNOSIS — Z98.890 STATUS POST LEFT KNEE SURGERY: Primary | ICD-10-CM

## 2022-09-19 PROCEDURE — 99024 POSTOP FOLLOW-UP VISIT: CPT | Performed by: ORTHOPAEDIC SURGERY

## 2022-09-19 RX ORDER — METHYLPREDNISOLONE 4 MG/1
4 TABLET ORAL
Qty: 1 DOSE PACK | Refills: 0 | Status: SHIPPED | OUTPATIENT
Start: 2022-09-19

## 2022-09-19 RX ORDER — MELOXICAM 15 MG/1
15 TABLET ORAL DAILY
Qty: 30 TABLET | Refills: 0 | Status: SHIPPED | OUTPATIENT
Start: 2022-09-19

## 2022-09-19 NOTE — LETTER
NOTIFICATION RETURN TO WORK / SCHOOL    9/19/2022 2:13 PM    Ms. Melinda [facility] [phone]      To Whom It May Concern:    Heron Arreguin is currently under the care of [facility]. She is not cleared to return to work until follow up in 6 weeks for follow up. If there are questions or concerns please have the patient contact our office.         Sincerely,      [employer] ANGELA

## 2022-09-19 NOTE — PROGRESS NOTES
Don Dowling (: 1955) is a 74 y.o. female patient, here for evaluation of the following chief complaint(s):  Surgical Follow-up (Left knee follow up/)       ASSESSMENT/PLAN:  Below is the assessment and plan developed based on review of pertinent history, physical exam, labs, studies, and medications. 32-year-old female comes in today for follow-up. She status post a PF J on 2022. Postoperatively she is doing fair. She is reached about 90 degrees of flexion. She has had only 1 physical therapy postoperatively secondary to delays in setting up home health with Worker's Comp. We will plan to transition to outpatient physical therapy to get more aggressive PT to work on range of motion. We will start patient on a steroid Dosepak followed by refill on the meloxicam.  Risks and benefits of medication discussed with patient. Patient verbalized understanding. Plans to follow-up in 3 weeks for range of motion check to ensure she is progressing. Incision healing well. Follow-up sooner as needed. 1. Status post left knee surgery  -     XR KNEE LT 3 V; Future  -     REFERRAL TO PHYSICAL THERAPY      Encounter Diagnosis   Name Primary? Status post left knee surgery Yes        No follow-ups on file. SUBJECTIVE/OBJECTIVE:  Don Dowling (: 1955) is a 74 y.o. female who presents today for the following:  Chief Complaint   Patient presents with    Surgical Follow-up     Left knee follow up         32-year-old female comes in today for follow-up. She status post a PF J on 2022. Postoperatively she is doing fair. She is reached about 90 degrees of flexion. Taking Tylenol as needed for pain. IMAGING:  XR Results (most recent):  Results from Appointment encounter on 22    XR KNEE LT 3 V    Narrative  Radiographs reviewed including 3 views of the left knee. Status post left knee PF J arthroplasty. No evidence of aseptic loosening. Overall alignment is appropriate. Patella tracking centrally. Allergies   Allergen Reactions    Codeine Hives       Current Outpatient Medications   Medication Sig    methylPREDNISolone (MEDROL DOSEPACK) 4 mg tablet Take 1 Tablet by mouth Specific Days and Specific Times. Per dose pack instructions    meloxicam (MOBIC) 15 mg tablet Take 1 Tablet by mouth daily. aspirin 81 mg chewable tablet Take 1 Tablet by mouth two (2) times a day. famotidine (PEPCID) 20 mg tablet Take 1 Tablet by mouth two (2) times a day. No current facility-administered medications for this visit. Past Medical History:   Diagnosis Date    Asthma         No past surgical history on file. No family history on file. Social History     Tobacco Use    Smoking status: Every Day    Smokeless tobacco: Never   Substance Use Topics    Alcohol use: Never        All systems reviewed x 12 and were negative with the exception of None      Pain Assessment  12/3/2021   Location of Pain Knee   Location Modifiers Left   Severity of Pain 7          Vitals:  Ht 4' 10\" (1.473 m)   Wt 100 lb (45.4 kg)   BMI 20.90 kg/m²    Body mass index is 20.9 kg/m². Physical Exam    Gen: NAD    Resp: Non-labored    LLE: Midline incision is healing well with no evidence of infection. No erythema. Range of motion 0-90°. No extensor lag. Grossly stable in the coronal and sagittal planes. No calf tenderness. No evidence of a DVT. Motor grossly intact. Sensation intact to light touch throughout. Palpable pedal pulses. Alix Watkins M.D. was available for immediate consultation as the supervising physician. An electronic signature was used to authenticate this note.   -- Edilia Arteaga PA-C

## 2022-10-10 ENCOUNTER — OFFICE VISIT (OUTPATIENT)
Dept: ORTHOPEDIC SURGERY | Age: 67
End: 2022-10-10
Payer: OTHER MISCELLANEOUS

## 2022-10-10 VITALS — BODY MASS INDEX: 20.99 KG/M2 | HEIGHT: 58 IN | WEIGHT: 100 LBS

## 2022-10-10 DIAGNOSIS — Z98.890 STATUS POST KNEE SURGERY: Primary | ICD-10-CM

## 2022-10-10 PROCEDURE — 99024 POSTOP FOLLOW-UP VISIT: CPT | Performed by: ORTHOPAEDIC SURGERY

## 2022-10-10 NOTE — PROGRESS NOTES
Olivia Sahni (: 1955) is a 74 y.o. female patient, here for evaluation of the following chief complaint(s):  Surgical Follow-up (Post op follow up - left PF J on 2022)       ASSESSMENT/PLAN:  Below is the assessment and plan developed based on review of pertinent history, physical exam, labs, studies, and medications. 81-year-old female comes in today for follow-up. She status post a PF J on 2022. Postoperatively she is doing well. She has reached greater than 115 degrees flexion. She continues with outpatient physical therapy. Overall states her pain is well controlled. Occasionally is bothersome at night but is improving. Plans to continue with outpatient physical therapy and follow-up for standard 10-week postop visit in 4 weeks. Follow-up sooner as needed. 1. Status post knee surgery      Encounter Diagnosis   Name Primary? Status post knee surgery Yes        No follow-ups on file. SUBJECTIVE/OBJECTIVE:  Olivia Sahni (: 1955) is a 74 y.o. female who presents today for the following:  Chief Complaint   Patient presents with    Surgical Follow-up     Post op follow up - left PF J on 2022       81-year-old female comes in today for follow-up. She status post a PF J on 2022. Postoperatively doing well. Comes in today for range of motion check. She has improved from 90 degrees to 115. Continues to work with physical therapy. Overall she is happy and pleased with her progress and outcomes thus far. IMAGING:  XR Results (most recent):  Results from Appointment encounter on 22    XR KNEE LT 3 V    Narrative  Radiographs reviewed including 3 views of the left knee. Status post left knee PF J arthroplasty. No evidence of aseptic loosening. Overall alignment is appropriate. Patella tracking centrally.        Allergies   Allergen Reactions    Codeine Hives       Current Outpatient Medications   Medication Sig    methylPREDNISolone (MEDROL DOSEPACK) 4 mg tablet Take 1 Tablet by mouth Specific Days and Specific Times. Per dose pack instructions    meloxicam (MOBIC) 15 mg tablet Take 1 Tablet by mouth daily. aspirin 81 mg chewable tablet Take 1 Tablet by mouth two (2) times a day. famotidine (PEPCID) 20 mg tablet Take 1 Tablet by mouth two (2) times a day. No current facility-administered medications for this visit. Past Medical History:   Diagnosis Date    Asthma         No past surgical history on file. No family history on file. Social History     Tobacco Use    Smoking status: Every Day    Smokeless tobacco: Never   Substance Use Topics    Alcohol use: Never        All systems reviewed x 12 and were negative with the exception of None      Pain Assessment  12/3/2021   Location of Pain Knee   Location Modifiers Left   Severity of Pain 7          Vitals:  Ht 4' 10\" (1.473 m)   Wt 100 lb (45.4 kg)   BMI 20.90 kg/m²    Body mass index is 20.9 kg/m². Physical Exam    Gen: NAD    Resp: Non-labored    LLE: Midline incision is healing well with no evidence of infection. No erythema. Range of motion 0-115°. No extensor lag. Grossly stable in the coronal and sagittal planes. No calf tenderness. No evidence of a DVT. Motor grossly intact. Sensation intact to light touch throughout. Palpable pedal pulses. Alvarez Jordan M.D. was available for immediate consultation as the supervising physician. An electronic signature was used to authenticate this note.   -- Riccardo Kuo PA-C

## 2022-10-10 NOTE — LETTER
NOTIFICATION RETURN TO WORK / SCHOOL    10/10/2022 1:53 PM    Ms. Melinda [facility] [phone]      To Whom It May Concern:    Jaxon Stern is currently under the care of [facility]. She will remain out of work until follow up in 4 weeks for reassessment. If there are questions or concerns please have the patient contact our office.         Sincerely,      Tato Kc PA-C

## 2022-11-01 ENCOUNTER — DOCUMENTATION ONLY (OUTPATIENT)
Dept: ORTHOPEDIC SURGERY | Age: 67
End: 2022-11-01

## 2022-11-07 ENCOUNTER — OFFICE VISIT (OUTPATIENT)
Dept: ORTHOPEDIC SURGERY | Age: 67
End: 2022-11-07
Payer: OTHER MISCELLANEOUS

## 2022-11-07 VITALS — WEIGHT: 100 LBS | BODY MASS INDEX: 20.99 KG/M2 | HEIGHT: 58 IN

## 2022-11-07 DIAGNOSIS — Z98.890 STATUS POST KNEE SURGERY: Primary | ICD-10-CM

## 2022-11-07 PROCEDURE — 99024 POSTOP FOLLOW-UP VISIT: CPT | Performed by: ORTHOPAEDIC SURGERY

## 2022-11-07 NOTE — LETTER
NOTIFICATION RETURN TO WORK / SCHOOL    11/7/2022 2:09 PM    Ms. Melinda [facility] [phone]      To Whom It May Concern:    Erendira Kerr is currently under the care of [facility]. She will return to work/school on: 11/28/22    If there are questions or concerns please have the patient contact our office.         Sincerely,      Maikol Kuhn PA-C

## 2022-11-07 NOTE — PROGRESS NOTES
Jyoti Julien (: 1955) is a 74 y.o. female patient, here for evaluation of the following chief complaint(s):  Surgical Follow-up (Left knee follow up/)       ASSESSMENT/PLAN:  Below is the assessment and plan developed based on review of pertinent history, physical exam, labs, studies, and medications. 29-year-old female comes in today for follow-up. Status post PF J on 2022. Postoperatively she continues to do well. She has reached greater than 120 degrees flexion. Incision healing well. She continues with outpatient physical therapy to work on strengthening and conditioning. Pain continues to improve. She still walks with a cane again continues to work with physical therapy. Plans to return to work at the end of this month. Work note given. Plans to follow-up in 6 months for standard postop check or sooner as needed. 1. Status post knee surgery      Encounter Diagnosis   Name Primary? Status post knee surgery Yes        No follow-ups on file. SUBJECTIVE/OBJECTIVE:  Jyoti Julien (: 1955) is a 74 y.o. female who presents today for the following:  Chief Complaint   Patient presents with    Surgical Follow-up     Left knee follow up         29-year-old female comes in today for follow-up. Status post PF J on 2022. Postoperatively she continues to do well. She has reached greater than 120 degrees flexion. Incision healing well. She continues with outpatient physical therapy to work on strengthening and conditioning. Pain continues to improve. IMAGING:  XR Results (most recent):  Results from Appointment encounter on 22    XR KNEE LT 3 V    Narrative  Radiographs reviewed including 3 views of the left knee. Status post left knee PF J arthroplasty. No evidence of aseptic loosening. Overall alignment is appropriate. Patella tracking centrally. Allergies   Allergen Reactions    Codeine Hives       No current outpatient medications on file. No current facility-administered medications for this visit. Past Medical History:   Diagnosis Date    Asthma         History reviewed. No pertinent surgical history. History reviewed. No pertinent family history. Social History     Tobacco Use    Smoking status: Every Day    Smokeless tobacco: Never   Substance Use Topics    Alcohol use: Never        All systems reviewed x 12 and were negative with the exception of None      Pain Assessment  12/3/2021   Location of Pain Knee   Location Modifiers Left   Severity of Pain 7          Vitals:  Ht 4' 10\" (1.473 m)   Wt 100 lb (45.4 kg)   BMI 20.90 kg/m²    Body mass index is 20.9 kg/m². Physical Exam    Gen: NAD    Resp: Non-labored    LLE: Midline incision is healing well with no evidence of infection. No erythema. Range of motion 0-120°. No extensor lag. Grossly stable in the coronal and sagittal planes. No calf tenderness. No evidence of a DVT. Motor grossly intact. Sensation intact to light touch throughout. Palpable pedal pulses. Estrellita Valdez M.D. was available for immediate consultation as the supervising physician. An electronic signature was used to authenticate this note.   -- Siva Pierre PA-C

## 2022-11-14 DIAGNOSIS — Z98.890 STATUS POST LEFT KNEE SURGERY: Primary | ICD-10-CM

## 2022-12-22 ENCOUNTER — OFFICE VISIT (OUTPATIENT)
Dept: ORTHOPEDIC SURGERY | Age: 67
End: 2022-12-22
Payer: OTHER MISCELLANEOUS

## 2022-12-22 VITALS — BODY MASS INDEX: 20.99 KG/M2 | HEIGHT: 58 IN | WEIGHT: 100 LBS

## 2022-12-22 DIAGNOSIS — Z98.890 STATUS POST KNEE SURGERY: Primary | ICD-10-CM

## 2022-12-22 PROCEDURE — 99213 OFFICE O/P EST LOW 20 MIN: CPT | Performed by: ORTHOPAEDIC SURGERY

## 2022-12-22 PROCEDURE — 1123F ACP DISCUSS/DSCN MKR DOCD: CPT | Performed by: ORTHOPAEDIC SURGERY

## 2022-12-22 RX ORDER — MELOXICAM 7.5 MG/1
7.5 TABLET ORAL DAILY
Qty: 30 TABLET | Refills: 0 | Status: SHIPPED | OUTPATIENT
Start: 2022-12-22

## 2022-12-22 NOTE — LETTER
NOTIFICATION RETURN TO WORK / SCHOOL    12/22/2022 1:20 PM    Ms. Melinda [facility] [phone]      To Whom It May Concern:    Layo Kam is currently under the care of [facility]. She can continue with working full time. Breaks as needed. Allow her to sit and work if able. If there are questions or concerns please have the patient contact our office.         Sincerely,      Jefferson Estrella PA-C

## 2022-12-22 NOTE — PROGRESS NOTES
Olivia Sahni (: 1955) is a 74 y.o. female patient, here for evaluation of the following chief complaint(s):  Surgical Follow-up (Left knee follow up/)       ASSESSMENT/PLAN:  Below is the assessment and plan developed based on review of pertinent history, physical exam, labs, studies, and medications. 82-year-old female comes in today for follow-up. She is status post a PF J done on 2022. Postoperatively doing fair. She has great range of motion. Greater than 120 degrees flexion. Minimal to no swelling today. X-rays show good overall alignment. No acute changes from previous x-rays. Comes in today for evaluation states she occasionally has some sharp shooting pain in that leg. Reports it can happen if she sitting standing or walking. Lasts a couple of seconds and then resolves. Knee overall stable on today's exam.  Encouraged patient to continue with physical therapy exercises. Prescription given for meloxicam 7.5 mg once daily. Risks and benefits of medication discussed with patient. Patient verbalizes understanding. Work note given for patient to take breaks as needed and sit for work as able. Otherwise plans to see patient in 6 months for standard postop check or sooner as needed. 1. Status post knee surgery  -     XR KNEE LT 3 V; Future      Encounter Diagnosis   Name Primary? Status post knee surgery Yes        No follow-ups on file. SUBJECTIVE/OBJECTIVE:  Olivia Sahni (: 1955) is a 74 y.o. female who presents today for the following:  Chief Complaint   Patient presents with    Surgical Follow-up     Left knee follow up         82-year-old female comes in today for follow-up. She is status post a PF J done on 2022. Postoperatively doing fair. She has great range of motion. Greater than 120 degrees flexion. Minimal to no swelling today. X-rays show good overall alignment. No acute changes from previous x-rays.   Comes in today for evaluation states she occasionally has some sharp shooting pain in that leg. Reports it can happen if she sitting standing or walking. Lasts a couple of seconds and then resolves. IMAGING:  XR Results (most recent):  Results from Appointment encounter on 09/19/22    XR KNEE LT 3 V    Narrative  Radiographs reviewed including 3 views of the left knee. Status post left knee PF J arthroplasty. No evidence of aseptic loosening. Overall alignment is appropriate. Patella tracking centrally. Allergies   Allergen Reactions    Codeine Hives       No current outpatient medications on file. No current facility-administered medications for this visit. Past Medical History:   Diagnosis Date    Asthma         No past surgical history on file. No family history on file. Social History     Tobacco Use    Smoking status: Every Day    Smokeless tobacco: Never   Substance Use Topics    Alcohol use: Never        All systems reviewed x 12 and were negative with the exception of None      Pain Assessment  12/3/2021   Location of Pain Knee   Location Modifiers Left   Severity of Pain 7          Vitals:  Ht 4' 10\" (1.473 m)   Wt 100 lb (45.4 kg)   BMI 20.90 kg/m²    Body mass index is 20.9 kg/m². Physical Exam    Gen: NAD    Resp: Non-labored    LLE: Midline incision is well-healed. No erythema. Range of motion 0-120°. No extensor lag. Grossly stable in the coronal and sagittal planes. No calf tenderness. No evidence of a DVT. Motor grossly intact. Sensation intact to light touch throughout. Palpable pedal pulses. Dionne Miguel M.D. was available for immediate consultation as the supervising physician. An electronic signature was used to authenticate this note.   -- Mari Medrano PA-C

## 2023-03-07 DIAGNOSIS — Z98.890 STATUS POST LEFT KNEE SURGERY: Primary | ICD-10-CM

## 2023-03-07 RX ORDER — AMOXICILLIN 500 MG/1
CAPSULE ORAL
Qty: 4 CAPSULE | Refills: 2 | Status: SHIPPED | OUTPATIENT
Start: 2023-03-07

## 2024-02-24 ENCOUNTER — HOSPITAL ENCOUNTER (EMERGENCY)
Facility: HOSPITAL | Age: 69
Discharge: HOME | End: 2024-02-24
Attending: STUDENT IN AN ORGANIZED HEALTH CARE EDUCATION/TRAINING PROGRAM
Payer: MEDICARE

## 2024-02-24 VITALS
HEART RATE: 80 BPM | RESPIRATION RATE: 19 BRPM | TEMPERATURE: 98.5 F | DIASTOLIC BLOOD PRESSURE: 82 MMHG | HEIGHT: 58 IN | BODY MASS INDEX: 20.04 KG/M2 | SYSTOLIC BLOOD PRESSURE: 157 MMHG | WEIGHT: 95.46 LBS | OXYGEN SATURATION: 98 %

## 2024-02-24 DIAGNOSIS — D23.30 DERMOID CYST OF FACE: Primary | ICD-10-CM

## 2024-02-24 PROCEDURE — 10060 I&D ABSCESS SIMPLE/SINGLE: CPT

## 2024-02-24 PROCEDURE — 99283 EMERGENCY DEPT VISIT LOW MDM: CPT

## 2024-02-24 PROCEDURE — 2500000003 HC RX 250 WO HCPCS: Performed by: STUDENT IN AN ORGANIZED HEALTH CARE EDUCATION/TRAINING PROGRAM

## 2024-02-24 RX ORDER — CEPHALEXIN 500 MG/1
500 CAPSULE ORAL 4 TIMES DAILY
Qty: 20 CAPSULE | Refills: 0 | Status: SHIPPED | OUTPATIENT
Start: 2024-02-24 | End: 2024-02-29

## 2024-02-24 RX ORDER — LIDOCAINE HYDROCHLORIDE AND EPINEPHRINE BITARTRATE 20; .01 MG/ML; MG/ML
5 INJECTION, SOLUTION SUBCUTANEOUS ONCE
Status: COMPLETED | OUTPATIENT
Start: 2024-02-24 | End: 2024-02-24

## 2024-02-24 RX ADMIN — LIDOCAINE HYDROCHLORIDE,EPINEPHRINE BITARTRATE 5 ML: 20; .01 INJECTION, SOLUTION INFILTRATION; PERINEURAL at 20:40

## 2024-02-24 ASSESSMENT — LIFESTYLE VARIABLES
HOW OFTEN DO YOU HAVE A DRINK CONTAINING ALCOHOL: NEVER
HOW MANY STANDARD DRINKS CONTAINING ALCOHOL DO YOU HAVE ON A TYPICAL DAY: PATIENT DOES NOT DRINK

## 2024-02-24 ASSESSMENT — PAIN DESCRIPTION - PAIN TYPE: TYPE: ACUTE PAIN

## 2024-02-24 ASSESSMENT — PAIN SCALES - GENERAL: PAINLEVEL_OUTOF10: 4

## 2024-02-24 ASSESSMENT — PAIN DESCRIPTION - ORIENTATION: ORIENTATION: LEFT

## 2024-02-24 ASSESSMENT — PAIN - FUNCTIONAL ASSESSMENT: PAIN_FUNCTIONAL_ASSESSMENT: 0-10

## 2024-02-24 ASSESSMENT — PAIN DESCRIPTION - LOCATION: LOCATION: JAW

## 2024-02-25 NOTE — ED TRIAGE NOTES
Pt reports to ED with friend reporting that she has had a bump on the left side of her jaw for two weeks. Pt reports pain at 4/10, that has been getting worse over the two weeks, and is worse at night. Pt denies fever, using any new products on face, shaving, ear issues. Pt respirations unlabored, no distress noted.

## 2024-02-25 NOTE — DISCHARGE INSTRUCTIONS
You presented to ED with swelling and pain in the left side of your face.  Been there for several weeks.  Cellulitis versus abscess on the differential.  Ultrasound at bedside showed possible fluid collection.  Attempts were made to I&D with 18-gauge needle.  Drainage tract made but no drainage was obtained during aspiration.  Suspect dermoid cyst.  Recommend warm compresses 3 times a day.  Take the prescribed Keflex out of abundance of caution due to your joint implant.  Follow-up with primary care.  Additional information for dermatology provided.  Contact them for follow-up appointment to further assess this skin lesion.

## 2024-02-25 NOTE — ED NOTES
Discharge instructions reviewed, discharge papers given and pt teaching completed. (1) prescription(s) discussed. Pt instructed to follow up with PCP and Dermatology regarding today's visit. Pt also instructed to report to ED if symptoms return, worsen, don't subside, and/or with onset of new symptoms.

## 2024-02-26 NOTE — ED PROVIDER NOTES
EMERGENCY DEPARTMENT PHYSICIAN NOTE     Patient: Summer Laureano     Time of Service: 2/24/2024  7:09 PM     Chief complaint:   Chief Complaint   Patient presents with    Jaw Pain    Abscess        HISTORY:  Patient is a 68 y.o. female who presents to the emergency department with complaints of lesion on left cheek/jaw.  Patient states has been there for about 2 weeks.  Causing some pain.  No erythema or signs of infection.  No dental concerns.  Vital signs stable.  Patient afebrile      Past Medical History:   Diagnosis Date    Asthma         Past Surgical History:   Procedure Laterality Date    JOINT REPLACEMENT Left         History reviewed. No pertinent family history.     Social History     Socioeconomic History    Marital status: Single     Spouse name: None    Number of children: None    Years of education: None    Highest education level: None   Tobacco Use    Smoking status: Every Day    Smokeless tobacco: Never   Substance and Sexual Activity    Alcohol use: Never    Drug use: Never        Current Medications: Reviewed in chart.    Allergies:   Allergies   Allergen Reactions    Percocet [Oxycodone-Acetaminophen] Hives    Codeine Hives          REVIEW OF SYSTEMS: See HPI for pertinent positives and negatives.      PHYSICAL EXAM:  BP (!) 157/82   Pulse 80   Temp 98.5 °F (36.9 °C) (Oral)   Resp 19   Ht 1.473 m (4' 10\")   Wt 43.3 kg (95 lb 7.4 oz)   SpO2 98%   BMI 19.95 kg/m²    Physical Exam  Constitutional:       Appearance: Normal appearance.   HENT:      Head: Normocephalic and atraumatic.        Right Ear: External ear normal.      Left Ear: External ear normal.      Nose: Nose normal.      Mouth/Throat:      Mouth: Mucous membranes are moist.      Pharynx: Oropharynx is clear.   Eyes:      Extraocular Movements: Extraocular movements intact.      Conjunctiva/sclera: Conjunctivae normal.   Cardiovascular:      Rate and Rhythm: Normal rate.   Pulmonary:      Effort: Pulmonary effort is normal.

## 2024-06-11 ENCOUNTER — HOSPITAL ENCOUNTER (EMERGENCY)
Facility: HOSPITAL | Age: 69
Discharge: HOME | End: 2024-06-11
Attending: EMERGENCY MEDICINE
Payer: MEDICARE

## 2024-06-11 VITALS
RESPIRATION RATE: 16 BRPM | TEMPERATURE: 97.9 F | DIASTOLIC BLOOD PRESSURE: 74 MMHG | HEART RATE: 80 BPM | SYSTOLIC BLOOD PRESSURE: 157 MMHG | OXYGEN SATURATION: 98 %

## 2024-06-11 DIAGNOSIS — D49.2 ABNORMAL SKIN GROWTH: ICD-10-CM

## 2024-06-11 DIAGNOSIS — L60.0 INGROWN TOENAIL OF LEFT FOOT: Primary | ICD-10-CM

## 2024-06-11 PROCEDURE — 99282 EMERGENCY DEPT VISIT SF MDM: CPT

## 2024-06-11 ASSESSMENT — ENCOUNTER SYMPTOMS
BACK PAIN: 0
EYE PAIN: 0
SORE THROAT: 0
SHORTNESS OF BREATH: 0
CHEST TIGHTNESS: 0
ABDOMINAL PAIN: 0
VOMITING: 0

## 2024-06-11 ASSESSMENT — PAIN DESCRIPTION - FREQUENCY: FREQUENCY: CONTINUOUS

## 2024-06-11 ASSESSMENT — LIFESTYLE VARIABLES
HOW MANY STANDARD DRINKS CONTAINING ALCOHOL DO YOU HAVE ON A TYPICAL DAY: PATIENT DOES NOT DRINK
HOW OFTEN DO YOU HAVE A DRINK CONTAINING ALCOHOL: NEVER

## 2024-06-11 ASSESSMENT — PAIN SCALES - GENERAL: PAINLEVEL_OUTOF10: 10

## 2024-06-11 ASSESSMENT — PAIN - FUNCTIONAL ASSESSMENT: PAIN_FUNCTIONAL_ASSESSMENT: 0-10

## 2024-06-11 ASSESSMENT — PAIN DESCRIPTION - ORIENTATION: ORIENTATION: LEFT

## 2024-06-11 ASSESSMENT — PAIN DESCRIPTION - LOCATION: LOCATION: TOE (COMMENT WHICH ONE)

## 2024-06-11 NOTE — ED PROVIDER NOTES
[facility] EMERGENCY DEPT  EMERGENCY DEPARTMENT ENCOUNTER      Pt Name: Summer Laureano  MRN: [id number]  Birthdate 1955  Date of evaluation: 6/11/2024  Provider: Ozzie Ho MD      HISTORY OF PRESENT ILLNESS      69-year-old female presents to the ER for 2 separate complaints.  Patient reports that she has had a growth on the side of her face for at least 6 months.  She states that another doctor tried to rylan it a while back but nothing really came out of it.  She has not seen a dermatologist for her skin growth.  Is not particularly painful to touch.  No active drainage or bleeding from the growth.  Additionally she reports that the second toe of her left foot has had pain for 1 month adjacent to the nail.  Reports pain with walking.              Nursing Notes were reviewed.    REVIEW OF SYSTEMS         Review of Systems   Constitutional:  Negative for chills and fever.   HENT:  Negative for congestion and sore throat.    Eyes:  Negative for pain and visual disturbance.   Respiratory:  Negative for chest tightness and shortness of breath.    Cardiovascular:  Negative for chest pain and leg swelling.   Gastrointestinal:  Negative for abdominal pain and vomiting.   Genitourinary:  Negative for dysuria.   Musculoskeletal:  Negative for back pain.   Skin:  Negative for rash.   Neurological:  Negative for weakness and headaches.   All other systems reviewed and are negative.          PAST MEDICAL HISTORY     Past Medical History:   Diagnosis Date    Asthma          SURGICAL HISTORY       Past Surgical History:   Procedure Laterality Date    JOINT REPLACEMENT Left          CURRENT MEDICATIONS       Previous Medications    ALBUTEROL SULFATE HFA (PROVENTIL;VENTOLIN;PROAIR) 108 (90 BASE) MCG/ACT INHALER    Inhale 2 puffs into the lungs every 4 hours as needed    FEXOFENADINE-PSEUDOEPHEDRINE (ALLEGRA-D 24HR) 180-240 MG PER EXTENDED RELEASE TABLET    Take 1 tablet by mouth daily       ALLERGIES     Percocet

## 2024-09-03 ENCOUNTER — APPOINTMENT (OUTPATIENT)
Facility: HOSPITAL | Age: 69
End: 2024-09-03
Payer: MEDICARE

## 2024-09-03 ENCOUNTER — HOSPITAL ENCOUNTER (EMERGENCY)
Facility: HOSPITAL | Age: 69
Discharge: HOME | End: 2024-09-03
Attending: EMERGENCY MEDICINE
Payer: MEDICARE

## 2024-09-03 VITALS
DIASTOLIC BLOOD PRESSURE: 76 MMHG | HEART RATE: 99 BPM | WEIGHT: 100 LBS | SYSTOLIC BLOOD PRESSURE: 138 MMHG | HEIGHT: 58 IN | TEMPERATURE: 97.8 F | OXYGEN SATURATION: 96 % | RESPIRATION RATE: 18 BRPM | BODY MASS INDEX: 20.99 KG/M2

## 2024-09-03 DIAGNOSIS — S09.93XA INJURY OF FACE, INITIAL ENCOUNTER: ICD-10-CM
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
## 2024-09-03 DIAGNOSIS — S89.92XA LEFT KNEE INJURY, INITIAL ENCOUNTER: Primary | ICD-10-CM

## 2024-09-03 PROCEDURE — 99284 EMERGENCY DEPT VISIT MOD MDM: CPT

## 2024-09-03 PROCEDURE — 70450 CT HEAD/BRAIN W/O DYE: CPT

## 2024-09-03 PROCEDURE — 73562 X-RAY EXAM OF KNEE 3: CPT

## 2024-09-03 PROCEDURE — 70486 CT MAXILLOFACIAL W/O DYE: CPT

## 2024-09-03 RX ORDER — CYCLOBENZAPRINE HCL 5 MG
10 TABLET ORAL 3 TIMES DAILY PRN
Qty: 20 TABLET | Refills: 0 | Status: SHIPPED | OUTPATIENT
Start: 2024-09-03 | End: 2024-09-08

## 2024-09-03 ASSESSMENT — PAIN - FUNCTIONAL ASSESSMENT: PAIN_FUNCTIONAL_ASSESSMENT: 0-10

## 2024-09-03 ASSESSMENT — PAIN DESCRIPTION - DESCRIPTORS: DESCRIPTORS: ACHING

## 2024-09-03 ASSESSMENT — PAIN SCALES - GENERAL: PAINLEVEL_OUTOF10: 5
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
## 2024-09-03 NOTE — ED PROVIDER NOTES
EMERGENCY DEPARTMENT PHYSICIAN NOTE     Patient: Summer Laureano     Time of Service: 9/3/2024  7:22 PM     Chief complaint: No chief complaint on file.       HISTORY:  Patient is a 69 y.o. female who presents to the emergency department with complaints of left facial pain, left knee pain.       Past Medical History:   Diagnosis Date    Asthma         Past Surgical History:   Procedure Laterality Date    JOINT REPLACEMENT Left         History reviewed. No pertinent family history.     Social History     Socioeconomic History    Marital status: Single     Spouse name: None    Number of children: None    Years of education: None    Highest education level: None   Tobacco Use    Smoking status: Every Day     Current packs/day: 0.50     Types: Cigarettes    Smokeless tobacco: Never   Substance and Sexual Activity    Alcohol use: Never    Drug use: Never    Sexual activity: Defer        Current Medications: Reviewed in chart.    Allergies:   Allergies   Allergen Reactions    Percocet [Oxycodone-Acetaminophen] Hives    Codeine Hives          REVIEW OF SYSTEMS: See HPI for pertinent positives and negatives.      PHYSICAL EXAM:  /76   Pulse 99   Temp 97.8 °F (36.6 °C) (Oral)   Resp 18   Ht 1.473 m (4' 10\")   Wt 45.4 kg (100 lb)   SpO2 96%   BMI 20.90 kg/m²    Physical Exam  Vitals and nursing note reviewed.   Constitutional:       General: She is not in acute distress.     Appearance: Normal appearance. She is normal weight. She is not toxic-appearing.   HENT:      Head: Normocephalic and atraumatic.      Nose: Nose normal.      Mouth/Throat:      Mouth: Mucous membranes are moist.      Pharynx: Oropharynx is clear.   Eyes:      Extraocular Movements: Extraocular movements intact.      Conjunctiva/sclera: Conjunctivae normal.      Pupils: Pupils are equal, round, and reactive to light.   Cardiovascular:      Rate and Rhythm: Normal rate and regular rhythm.      Pulses: Normal pulses.      Heart sounds: